# Patient Record
Sex: FEMALE | Race: BLACK OR AFRICAN AMERICAN | NOT HISPANIC OR LATINO | URBAN - METROPOLITAN AREA
[De-identification: names, ages, dates, MRNs, and addresses within clinical notes are randomized per-mention and may not be internally consistent; named-entity substitution may affect disease eponyms.]

---

## 2017-07-27 ENCOUNTER — EMERGENCY (EMERGENCY)
Facility: HOSPITAL | Age: 27
LOS: 1 days | Discharge: HOME | End: 2017-07-27
Admitting: EMERGENCY MEDICINE
Payer: COMMERCIAL

## 2017-07-27 VITALS
RESPIRATION RATE: 16 BRPM | HEART RATE: 73 BPM | DIASTOLIC BLOOD PRESSURE: 79 MMHG | SYSTOLIC BLOOD PRESSURE: 144 MMHG | OXYGEN SATURATION: 100 % | TEMPERATURE: 98 F

## 2017-07-27 LAB
ALBUMIN SERPL ELPH-MCNC: 4.6 G/DL — SIGNIFICANT CHANGE UP (ref 3.3–5)
ALP SERPL-CCNC: 72 U/L — SIGNIFICANT CHANGE UP (ref 40–120)
ALT FLD-CCNC: 11 U/L — SIGNIFICANT CHANGE UP (ref 4–33)
APPEARANCE UR: SIGNIFICANT CHANGE UP
AST SERPL-CCNC: 17 U/L — SIGNIFICANT CHANGE UP (ref 4–32)
BACTERIA # UR AUTO: SIGNIFICANT CHANGE UP
BASOPHILS # BLD AUTO: 0.06 K/UL — SIGNIFICANT CHANGE UP (ref 0–0.2)
BASOPHILS NFR BLD AUTO: 0.6 % — SIGNIFICANT CHANGE UP (ref 0–2)
BASOPHILS NFR SPEC: 0.9 % — SIGNIFICANT CHANGE UP (ref 0–2)
BILIRUB SERPL-MCNC: 0.2 MG/DL — SIGNIFICANT CHANGE UP (ref 0.2–1.2)
BILIRUB UR-MCNC: NEGATIVE — SIGNIFICANT CHANGE UP
BLOOD UR QL VISUAL: HIGH
BUN SERPL-MCNC: 7 MG/DL — SIGNIFICANT CHANGE UP (ref 7–23)
CALCIUM SERPL-MCNC: 9.3 MG/DL — SIGNIFICANT CHANGE UP (ref 8.4–10.5)
CHLORIDE SERPL-SCNC: 105 MMOL/L — SIGNIFICANT CHANGE UP (ref 98–107)
CO2 SERPL-SCNC: 23 MMOL/L — SIGNIFICANT CHANGE UP (ref 22–31)
COLOR SPEC: HIGH
CREAT SERPL-MCNC: 0.83 MG/DL — SIGNIFICANT CHANGE UP (ref 0.5–1.3)
EOSINOPHIL # BLD AUTO: 0.09 K/UL — SIGNIFICANT CHANGE UP (ref 0–0.5)
EOSINOPHIL NFR BLD AUTO: 0.9 % — SIGNIFICANT CHANGE UP (ref 0–6)
EOSINOPHIL NFR FLD: 0 % — SIGNIFICANT CHANGE UP (ref 0–6)
GIANT PLATELETS BLD QL SMEAR: PRESENT — SIGNIFICANT CHANGE UP
GLUCOSE SERPL-MCNC: 95 MG/DL — SIGNIFICANT CHANGE UP (ref 70–99)
GLUCOSE UR-MCNC: NEGATIVE — SIGNIFICANT CHANGE UP
HCG SERPL-ACNC: 1423 MIU/ML — SIGNIFICANT CHANGE UP
HCT VFR BLD CALC: 40.5 % — SIGNIFICANT CHANGE UP (ref 34.5–45)
HGB BLD-MCNC: 13.2 G/DL — SIGNIFICANT CHANGE UP (ref 11.5–15.5)
IMM GRANULOCYTES # BLD AUTO: 0.02 # — SIGNIFICANT CHANGE UP
IMM GRANULOCYTES NFR BLD AUTO: 0.2 % — SIGNIFICANT CHANGE UP (ref 0–1.5)
KETONES UR-MCNC: NEGATIVE — SIGNIFICANT CHANGE UP
LEUKOCYTE ESTERASE UR-ACNC: HIGH
LYMPHOCYTES # BLD AUTO: 4.29 K/UL — HIGH (ref 1–3.3)
LYMPHOCYTES # BLD AUTO: 41.2 % — SIGNIFICANT CHANGE UP (ref 13–44)
LYMPHOCYTES NFR SPEC AUTO: 45.1 % — HIGH (ref 13–44)
MCHC RBC-ENTMCNC: 27.4 PG — SIGNIFICANT CHANGE UP (ref 27–34)
MCHC RBC-ENTMCNC: 32.6 % — SIGNIFICANT CHANGE UP (ref 32–36)
MCV RBC AUTO: 84.2 FL — SIGNIFICANT CHANGE UP (ref 80–100)
MONOCYTES # BLD AUTO: 0.59 K/UL — SIGNIFICANT CHANGE UP (ref 0–0.9)
MONOCYTES NFR BLD AUTO: 5.7 % — SIGNIFICANT CHANGE UP (ref 2–14)
MONOCYTES NFR BLD: 5.3 % — SIGNIFICANT CHANGE UP (ref 2–9)
MUCOUS THREADS # UR AUTO: SIGNIFICANT CHANGE UP
NEUTROPHIL AB SER-ACNC: 47.8 % — SIGNIFICANT CHANGE UP (ref 43–77)
NEUTROPHILS # BLD AUTO: 5.36 K/UL — SIGNIFICANT CHANGE UP (ref 1.8–7.4)
NEUTROPHILS NFR BLD AUTO: 51.4 % — SIGNIFICANT CHANGE UP (ref 43–77)
NITRITE UR-MCNC: NEGATIVE — SIGNIFICANT CHANGE UP
NRBC # FLD: 0 — SIGNIFICANT CHANGE UP
PH UR: 6 — SIGNIFICANT CHANGE UP (ref 4.6–8)
PLATELET # BLD AUTO: 285 K/UL — SIGNIFICANT CHANGE UP (ref 150–400)
PLATELET COUNT - ESTIMATE: NORMAL — SIGNIFICANT CHANGE UP
PMV BLD: 9.2 FL — SIGNIFICANT CHANGE UP (ref 7–13)
POIKILOCYTOSIS BLD QL AUTO: SLIGHT — SIGNIFICANT CHANGE UP
POTASSIUM SERPL-MCNC: 3.7 MMOL/L — SIGNIFICANT CHANGE UP (ref 3.5–5.3)
POTASSIUM SERPL-SCNC: 3.7 MMOL/L — SIGNIFICANT CHANGE UP (ref 3.5–5.3)
PROT SERPL-MCNC: 7.3 G/DL — SIGNIFICANT CHANGE UP (ref 6–8.3)
PROT UR-MCNC: 100 — HIGH
RBC # BLD: 4.81 M/UL — SIGNIFICANT CHANGE UP (ref 3.8–5.2)
RBC # FLD: 13.4 % — SIGNIFICANT CHANGE UP (ref 10.3–14.5)
RBC CASTS # UR COMP ASSIST: >50 — HIGH (ref 0–?)
REVIEW TO FOLLOW: YES — SIGNIFICANT CHANGE UP
SODIUM SERPL-SCNC: 141 MMOL/L — SIGNIFICANT CHANGE UP (ref 135–145)
SP GR SPEC: 1.02 — SIGNIFICANT CHANGE UP (ref 1–1.03)
SQUAMOUS # UR AUTO: SIGNIFICANT CHANGE UP
UROBILINOGEN FLD QL: NORMAL E.U. — SIGNIFICANT CHANGE UP (ref 0.1–0.2)
VARIANT LYMPHS # BLD: 0.9 % — SIGNIFICANT CHANGE UP
WBC # BLD: 10.41 K/UL — SIGNIFICANT CHANGE UP (ref 3.8–10.5)
WBC # FLD AUTO: 10.41 K/UL — SIGNIFICANT CHANGE UP (ref 3.8–10.5)
WBC UR QL: SIGNIFICANT CHANGE UP (ref 0–?)

## 2017-07-27 PROCEDURE — 76830 TRANSVAGINAL US NON-OB: CPT | Mod: 26

## 2017-07-27 PROCEDURE — 99285 EMERGENCY DEPT VISIT HI MDM: CPT

## 2017-07-27 RX ORDER — SODIUM CHLORIDE 9 MG/ML
1000 INJECTION INTRAMUSCULAR; INTRAVENOUS; SUBCUTANEOUS ONCE
Qty: 0 | Refills: 0 | Status: COMPLETED | OUTPATIENT
Start: 2017-07-27 | End: 2017-07-27

## 2017-07-27 RX ADMIN — SODIUM CHLORIDE 1000 MILLILITER(S): 9 INJECTION INTRAMUSCULAR; INTRAVENOUS; SUBCUTANEOUS at 21:27

## 2017-07-27 NOTE — ED PROVIDER NOTE - GENITOURINARY, MLM
No discharge, lesions. + blood in vaginal vault no active bleeding, cervix parous, no clots No discharge, lesions. + blood in vaginal vault no active bleeding, cervix parous, no clots, no adnexal tenderness or CMT chaperone: SKYE valero

## 2017-07-27 NOTE — ED PROVIDER NOTE - OBJECTIVE STATEMENT
27 yo F  currently 10 weeks pregnant based on LMP of  here for vaginal bleeding x today. pt states she had home pregnancy test which was positive, had positive urine test at OBGYN but no TVUS as pt was planning AB on saturday. Today +vaginal bleeding with clot. Otherwise well. Denies fever chills vomiting diarrhea CP SOB HA dysuria vaginal discharge dizziness.

## 2017-07-27 NOTE — ED ADULT NURSE NOTE - OBJECTIVE STATEMENT
Pt presented to ED with c/o vaginal bleeding and abdominal cramping, pt stated to be 10 weeks pregnant.

## 2017-07-27 NOTE — ED PROVIDER NOTE - PLAN OF CARE
RETURN IN 2 DAYS FOR REPEAT HCG BLOOD TEST AND ULTRASOUND. Rest, drink plenty of fluids.  Advance activity as tolerated.  Continue all previously prescribed medications as directed. You can use motrin 600mg every 6-8 hours for pain or fever, and/or Tylenol 650 mg every 4 hours for pain/fever. Follow up with your primary care physician in 48-72 hours- bring copies of your results.  Return to the emergency department for chest pain, shortness of breath, dizziness, or worsening, concerning, or persistent symptoms.

## 2017-07-27 NOTE — ED PROVIDER NOTE - PROGRESS NOTE DETAILS
SKYE Bray: US with no visualized IUP, AB vs. ectopic. HCG 1000s. Will page GYN SKYE Bray: pt doing well, seen by OB, follow up in two days for repeat beta and hcg.

## 2017-07-27 NOTE — ED PROVIDER NOTE - CHPI ED SYMPTOMS NEG
no weakness/no chills/no decreased eating/drinking/no tingling/no nausea/no dizziness/no pain/no vomiting/no fever/no numbness

## 2017-07-27 NOTE — ED ADULT TRIAGE NOTE - CHIEF COMPLAINT QUOTE
pt states she is 10 weeks pregnant c/o vaginal bleeding with clots x1 day. c/o abd cramping. states she scheduled for  for saturday.

## 2017-07-27 NOTE — ED PROVIDER NOTE - CARE PLAN
Principal Discharge DX:	Vaginal bleeding before 22 weeks gestation  Instructions for follow-up, activity and diet:	RETURN IN 2 DAYS FOR REPEAT HCG BLOOD TEST AND ULTRASOUND. Rest, drink plenty of fluids.  Advance activity as tolerated.  Continue all previously prescribed medications as directed. You can use motrin 600mg every 6-8 hours for pain or fever, and/or Tylenol 650 mg every 4 hours for pain/fever. Follow up with your primary care physician in 48-72 hours- bring copies of your results.  Return to the emergency department for chest pain, shortness of breath, dizziness, or worsening, concerning, or persistent symptoms.

## 2017-07-28 VITALS
DIASTOLIC BLOOD PRESSURE: 79 MMHG | OXYGEN SATURATION: 100 % | HEART RATE: 63 BPM | TEMPERATURE: 98 F | SYSTOLIC BLOOD PRESSURE: 106 MMHG | RESPIRATION RATE: 14 BRPM

## 2017-07-28 NOTE — CONSULT NOTE ADULT - SUBJECTIVE AND OBJECTIVE BOX
HPI: 26y , LMP 17 presents with VB x several days duration w/clots. Pt noted + home pregnancy test. This is an unwanted pregnancy. Pt notes mild associated cramping. She denies lightheadedness, SOB, fever/chills, n/v. No other complaints at this time.    OB/GYN HISTORY:   Last Menstrual Period 17     OBGYN: C/S x 1, prenatal course c/b sPEC/Mg, denies other issues  PMH:denies  PSH:denies  Meds:denies  Allergies:denies  Social:denies  FHx:denies    ROS wnl, except as per HPI    Vital Signs Last 24 Hrs  T(C): 36.6 (2017 00:59), Max: 36.7 (2017 19:19)  T(F): 97.8 (2017 00:59), Max: 98 (2017 19:19)  HR: 63 (2017 00:59) (63 - 73)  BP: 106/79 (2017 00:59) (106/79 - 144/79)  BP(mean): --  RR: 14 (2017 00:59) (14 - 16)  SpO2: 100% (2017 00:59) (100% - 100%)    Physical Exam:  Gen:AAOx3, NAD  CV: RRR  Pulm:CTAB/L  Abd: soft, NT, ND  Gyn:mild amount of dark red blood in vaginal vault, adnexa NT b/l, cervix closed  Ext: NTB/L    LABS:                        13.2   10.41 )-----------( 285      ( 2017 21:15 )             40.5         141  |  105  |  7   ----------------------------<  95  3.7   |  23  |  0.83    Ca    9.3      2017 21:15    TPro  7.3  /  Alb  4.6  /  TBili  0.2  /  DBili  x   /  AST  17  /  ALT  11  /  AlkPhos  72        Urinalysis Basic - ( 2017 21:15 )    Color: PINK / Appearance: HAZY / S.025 / pH: 6.0  Gluc: NEGATIVE / Ketone: NEGATIVE  / Bili: NEGATIVE / Urobili: NORMAL E.U.   Blood: LARGE / Protein: 100 / Nitrite: NEGATIVE   Leuk Esterase: LARGE / RBC: >50 / WBC 25-50   Sq Epi: FEW / Non Sq Epi: x / Bacteria: FEW    bHCG 1423    RADIOLOGY & ADDITIONAL STUDIES:  TVUS ():  FINDINGS:    Uterus: 9.5 x 4.8 x 5.8 cm. There is heterogeneous material within the   uterine cavity likely blood products. There is no visualized intrauterine   gestation sac.     Endometrium:18 mm.     Right ovary: 3.6 x 1.8 x 2.3 cm. Normal color flow.    Left ovary: 4.1 x 2.0 x 2.9 cm. A 1 cm left paraovarian cyst. Normal   color flow.    No adnexal mass is seen.    Fluid: None.    IMPRESSION:    No intrauterine gestation or adnexal mass is seen. Differential includes    in progress versus nonvisualized ectopic pregnancy. Serial beta   hCG follow-up is recommended.    A/P: 34 y/o , LMP 17, presents with VB, +bHCG, pregnancy of unknown location on TVUS - most likely SAB vs ectopic pregnancy vs early IUP. Pt currently stable.  -d/c home with follow up in 48 hrs for bHCG trending  -ectopic precautions - return to ED if severe abdominal pain, severe vaginal bleeding, other such worrisome symptoms    Pt seen with Dr. Mojgan Mathew, pgy2

## 2017-07-29 ENCOUNTER — EMERGENCY (EMERGENCY)
Facility: HOSPITAL | Age: 27
LOS: 1 days | Discharge: HOME | End: 2017-07-29
Attending: EMERGENCY MEDICINE | Admitting: EMERGENCY MEDICINE
Payer: COMMERCIAL

## 2017-07-29 VITALS
DIASTOLIC BLOOD PRESSURE: 77 MMHG | RESPIRATION RATE: 16 BRPM | OXYGEN SATURATION: 100 % | SYSTOLIC BLOOD PRESSURE: 155 MMHG | TEMPERATURE: 99 F | HEART RATE: 70 BPM

## 2017-07-29 LAB
BACTERIA UR CULT: SIGNIFICANT CHANGE UP
HCG SERPL-ACNC: 281.8 MIU/ML — SIGNIFICANT CHANGE UP
SPECIMEN SOURCE: SIGNIFICANT CHANGE UP

## 2017-07-29 PROCEDURE — 99284 EMERGENCY DEPT VISIT MOD MDM: CPT

## 2017-07-29 NOTE — ED PROVIDER NOTE - CARE PLAN
Instructions for follow-up, activity and diet:	Take all medications as directed, for pain take Ibuprofen (Motrin, Advil) or Acetaminophen (Tylenol) as directed on packaging.  Follow up with Ob/Gyn as directed.  You were given copies of all labs and imaging results from this ER visit, please take them to your appointment.  If needed call [phone] to find a primary care physician or call  [phone] to schedule an appointment with the general medicine clinic.  Return to the ER for any worsening symptoms or other concerns. Principal Discharge DX:	Threatened miscarriage in early pregnancy  Instructions for follow-up, activity and diet:	Take all medications as directed, for pain take Ibuprofen (Motrin, Advil) or Acetaminophen (Tylenol) as directed on packaging.  Follow up with Ob/Gyn as directed.  You were given copies of all labs and imaging results from this ER visit, please take them to your appointment.  If needed call [phone] to find a primary care physician or call  [phone] to schedule an appointment with the general medicine clinic.  Return to the ER for any worsening symptoms or other concerns.

## 2017-07-29 NOTE — ED PROVIDER NOTE - OBJECTIVE STATEMENT
27yo F no significant PMH,  10 weeks pregnant by dates, seen in [facility] ED 2d ago for vaginal bleeding. Patient returns to ED for repeat beta-hcg as recommended by Ob/Gyn on consult. Patient reports no pelvic/abd pain or cramping currently, has decreasing vaginal bleeding.

## 2017-07-29 NOTE — ED ADULT TRIAGE NOTE - CHIEF COMPLAINT QUOTE
Pt states she is pregnant but no IUP noted was seen here yesterday.  she was told to come back to the ED for HCG levels.LMP 5/17/2017 and r/o ectopic pregnancy

## 2017-07-29 NOTE — ED PROVIDER NOTE - PLAN OF CARE
Take all medications as directed, for pain take Ibuprofen (Motrin, Advil) or Acetaminophen (Tylenol) as directed on packaging.  Follow up with Ob/Gyn as directed.  You were given copies of all labs and imaging results from this ER visit, please take them to your appointment.  If needed call [phone] to find a primary care physician or call  [phone] to schedule an appointment with the general medicine clinic.  Return to the ER for any worsening symptoms or other concerns.

## 2017-08-05 ENCOUNTER — EMERGENCY (EMERGENCY)
Facility: HOSPITAL | Age: 27
LOS: 1 days | Discharge: HOME | End: 2017-08-05
Admitting: EMERGENCY MEDICINE
Payer: COMMERCIAL

## 2017-08-05 VITALS
RESPIRATION RATE: 18 BRPM | HEART RATE: 75 BPM | SYSTOLIC BLOOD PRESSURE: 115 MMHG | TEMPERATURE: 98 F | DIASTOLIC BLOOD PRESSURE: 66 MMHG | OXYGEN SATURATION: 98 %

## 2017-08-05 LAB — HCG SERPL-ACNC: 14.71 MIU/ML — SIGNIFICANT CHANGE UP

## 2017-08-05 PROCEDURE — 99285 EMERGENCY DEPT VISIT HI MDM: CPT

## 2017-08-05 NOTE — ED PROVIDER NOTE - CARE PLAN
Principal Discharge DX:	Miscarriage Principal Discharge DX:	Miscarriage  Instructions for follow-up, activity and diet:	Follow up [facility] within 1-2 weeks. Rest, increase your fluids, no heavy lifting. Worsening, continued or new concerning symptoms return to the emergency department.

## 2017-08-05 NOTE — ED ADULT NURSE NOTE - CHIEF COMPLAINT QUOTE
Pt states she had a miscarriage last week, and is here for a follow up blood test on her pregnancy levels. Denies abdominal pain.

## 2017-08-05 NOTE — ED PROVIDER NOTE - OBJECTIVE STATEMENT
27 y/o F  s/p miscarriage at 10 weeks here for repeat BHCG. Based on results trending down. 1423 then repeat on . Appears well. Denies abdominal pain, weakness, nausea, vomiting, fever, chills. States slight vaginal spotting improving.  Appears well.

## 2017-08-05 NOTE — ED PROVIDER NOTE - PROGRESS NOTE DETAILS
[facility] 1423 to 281 to 14. OBGYN resident notified. Pt to follow up in clinic in 1-2 weeks. .

## 2017-08-05 NOTE — ED PROVIDER NOTE - PLAN OF CARE
Follow up [facility] within 1-2 weeks. Rest, increase your fluids, no heavy lifting. Worsening, continued or new concerning symptoms return to the emergency department.

## 2017-08-18 NOTE — CHART NOTE - NSCHARTNOTEFT_GEN_A_CORE
33 , LMP 17, initially presented to the ED on  for VB. Found to have pregnancy of unknown location. bHCG downtrending.  1423()->281.8 ()->14.71 (). Serial bHCG until negative still required.   Multiple phone calls and voice messages left. The most recent attempts being on 17 and 17. Pt lost to follow-up.    -Certified letter written. Will mail out on 17    GEORGIANA Whaley PGY1  OBGYN #[id number] , LMP 17, initially presented to the ED on  for VB. Found to have pregnancy of unknown location. bHCG downtrending.  1423()->281.8 ()->14.71 (). Serial bHCG until negative still required.   Multiple phone calls and voice messages left. The most recent attempts being on 17 and 17. Pt lost to follow-up.    -Certified letter written. Will mail out on 17    GEORGIANA Whaley PGY1  OBGYN #[id number]

## 2021-03-16 NOTE — ED PROVIDER NOTE - NSCAREINITIATED _GEN_ER
EXAMINATION TYPE: US venous doppler duplex LE 

 

DATE OF EXAM: 3/16/2021 7:59 PM

 

COMPARISON: NONE

 

CLINICAL HISTORY: Rule out DVT. R/O DVT. Chest pain. 

 

SIDE PERFORMED: Bilateral  

 

TECHNIQUE:  The lower extremity deep venous system is examined utilizing real time linear array sonog
fanta with graded compression, doppler sonography and color-flow sonography.

 

VESSELS IMAGED:

Common Femoral Vein

Deep Femoral Vein

Greater Saphenous Vein *

Femoral Vein

Popliteal Vein

Small Saphenous Vein *

Proximal Calf Veins

(* superficial vessels)

 

Limited due to swelling.

 

Right Leg:  No evidence of DVT in veins imaged at this time from prox calf veins to CFV/GSV.

 

Left Leg:  No evidence of DVT in veins imaged at this time from prox calf veins to CFV/GSV.

 

 

 

IMPRESSION:  

No sign of deep vein thrombosis in both legs. Loraine Thomason(Attending)